# Patient Record
Sex: FEMALE | URBAN - METROPOLITAN AREA
[De-identification: names, ages, dates, MRNs, and addresses within clinical notes are randomized per-mention and may not be internally consistent; named-entity substitution may affect disease eponyms.]

---

## 2022-01-12 ENCOUNTER — APPOINTMENT (RX ONLY)
Dept: URBAN - METROPOLITAN AREA OTHER 8 | Facility: OTHER | Age: 27
Setting detail: DERMATOLOGY
End: 2022-01-12

## 2022-01-12 DIAGNOSIS — L81.4 OTHER MELANIN HYPERPIGMENTATION: ICD-10-CM

## 2022-01-12 DIAGNOSIS — L71.8 OTHER ROSACEA: ICD-10-CM

## 2022-01-12 DIAGNOSIS — D22 MELANOCYTIC NEVI: ICD-10-CM

## 2022-01-12 DIAGNOSIS — Z12.83 ENCOUNTER FOR SCREENING FOR MALIGNANT NEOPLASM OF SKIN: ICD-10-CM

## 2022-01-12 PROBLEM — D22.5 MELANOCYTIC NEVI OF TRUNK: Status: ACTIVE | Noted: 2022-01-12

## 2022-01-12 PROCEDURE — ? PRESCRIPTION

## 2022-01-12 PROCEDURE — 99203 OFFICE O/P NEW LOW 30 MIN: CPT

## 2022-01-12 PROCEDURE — ? COUNSELING

## 2022-01-12 PROCEDURE — ? OBSERVATION AND MEASURE

## 2022-01-12 RX ORDER — IVERMECTIN 10 MG/G
CREAM TOPICAL QD
Qty: 45 | Refills: 4 | Status: ERX | COMMUNITY
Start: 2022-01-12

## 2022-01-12 RX ADMIN — IVERMECTIN: 10 CREAM TOPICAL at 00:00

## 2022-01-12 ASSESSMENT — LOCATION DETAILED DESCRIPTION DERM
LOCATION DETAILED: RIGHT INFERIOR UPPER BACK
LOCATION DETAILED: RIGHT MEDIAL FOREHEAD
LOCATION DETAILED: RIGHT SUPERIOR LATERAL LOWER BACK
LOCATION DETAILED: RIGHT INFERIOR CENTRAL MALAR CHEEK
LOCATION DETAILED: EPIGASTRIC SKIN
LOCATION DETAILED: LEFT UPPER CUTANEOUS LIP
LOCATION DETAILED: RIGHT MEDIAL BREAST 2-3:00 REGION
LOCATION DETAILED: RIGHT UPPER CUTANEOUS LIP
LOCATION DETAILED: LEFT INFERIOR CENTRAL MALAR CHEEK
LOCATION DETAILED: RIGHT INFERIOR VERMILION LIP

## 2022-01-12 ASSESSMENT — LOCATION SIMPLE DESCRIPTION DERM
LOCATION SIMPLE: RIGHT FOREHEAD
LOCATION SIMPLE: RIGHT INFERIOR LIP
LOCATION SIMPLE: LEFT CHEEK
LOCATION SIMPLE: RIGHT CHEEK
LOCATION SIMPLE: RIGHT BREAST
LOCATION SIMPLE: RIGHT LOWER BACK
LOCATION SIMPLE: ABDOMEN
LOCATION SIMPLE: RIGHT UPPER BACK
LOCATION SIMPLE: LEFT LIP
LOCATION SIMPLE: RIGHT LIP

## 2022-01-12 ASSESSMENT — SEVERITY ASSESSMENT OVERALL AMONG ALL PATIENTS
IN YOUR EXPERIENCE, AMONG ALL PATIENTS YOU HAVE SEEN WITH THIS CONDITION, HOW SEVERE IS THIS PATIENT'S CONDITION?: MILD TO MODERATE

## 2022-01-12 ASSESSMENT — LOCATION ZONE DERM
LOCATION ZONE: FACE
LOCATION ZONE: TRUNK
LOCATION ZONE: LIP

## 2022-01-12 NOTE — PROCEDURE: OBSERVATION
Detail Level: Detailed
Size Of Lesion: 0.4cm
Morphology Per Location (Optional): Darker brown irregular hazy macule
Size Of Lesion: 0.6cm
Morphology Per Location (Optional): Asymmetrical medium brown macule with darker dots

## 2022-03-23 ENCOUNTER — APPOINTMENT (RX ONLY)
Dept: URBAN - METROPOLITAN AREA OTHER 8 | Facility: OTHER | Age: 27
Setting detail: DERMATOLOGY
End: 2022-03-23

## 2022-03-23 DIAGNOSIS — D22 MELANOCYTIC NEVI: ICD-10-CM

## 2022-03-23 DIAGNOSIS — Z41.9 ENCOUNTER FOR PROCEDURE FOR PURPOSES OTHER THAN REMEDYING HEALTH STATE, UNSPECIFIED: ICD-10-CM

## 2022-03-23 PROBLEM — D22.5 MELANOCYTIC NEVI OF TRUNK: Status: ACTIVE | Noted: 2022-03-23

## 2022-03-23 PROCEDURE — ? OBSERVATION AND MEASURE

## 2022-03-23 PROCEDURE — 99212 OFFICE O/P EST SF 10 MIN: CPT

## 2022-03-23 PROCEDURE — ? PRODUCT LINE (ANTI-AGING)

## 2022-03-23 PROCEDURE — ? COUNSELING

## 2022-03-23 ASSESSMENT — LOCATION SIMPLE DESCRIPTION DERM
LOCATION SIMPLE: RIGHT BREAST
LOCATION SIMPLE: RIGHT UPPER BACK
LOCATION SIMPLE: RIGHT LOWER BACK
LOCATION SIMPLE: ABDOMEN

## 2022-03-23 ASSESSMENT — LOCATION DETAILED DESCRIPTION DERM
LOCATION DETAILED: RIGHT MEDIAL BREAST 2-3:00 REGION
LOCATION DETAILED: RIGHT INFERIOR UPPER BACK
LOCATION DETAILED: RIGHT SUPERIOR LATERAL LOWER BACK
LOCATION DETAILED: EPIGASTRIC SKIN

## 2022-03-23 ASSESSMENT — LOCATION ZONE DERM: LOCATION ZONE: TRUNK

## 2022-03-23 NOTE — PROCEDURE: PRODUCT LINE (ANTI-AGING)
Product 71 Price (In Dollars - Numeric Only, No Special Characters Or $): 0.00
Product 26 Units: 0
Product 2 Units: 1
Product 6 Application Directions: Apply to eyelid skin twice a day.
Render Product Pricing In Note: No
Product 2 Application Directions: Apply across entire face at bedtime.
Name Of Product 13: NeoCutis Bio Serum
Product 10 Application Directions: Apply to affected areas of the neck once nightly
Product 8 Application Directions: Apply across pigmented areas of face twice a day.
Name Of Product 7: SkinBetter Intensive Treatment LINES
Name Of Product 18: AHA /BHA exfoliating cleanser
Name Of Product 16: NeoCutis Micro Firm Neck
Name Of Product 5: SkinBetter Interfuse Treatment (Face & Neck cream)
Name Of Product 3: SkinBetter Instant Effect Eye Gel
Product 13 Price (In Dollars - Numeric Only, No Special Characters Or $): 235
Name Of Product 11: Neocutis Bio Cream
Name Of Product 1: SkinBetter Alto Defense Serum
Name Of Product 9: SkinBetter TRIO
Name Of Product 14: NeoCutis Journee
Product 7 Price (In Dollars - Numeric Only, No Special Characters Or $): 130.00
Detail Level: Zone
Product 5 Price (In Dollars - Numeric Only, No Special Characters Or $): 105.00
Product 11 Price (In Dollars - Numeric Only, No Special Characters Or $): 160
Product 16 Price (In Dollars - Numeric Only, No Special Characters Or $): 135
Product 3 Price (In Dollars - Numeric Only, No Special Characters Or $): 90.00
Product 1 Price (In Dollars - Numeric Only, No Special Characters Or $): 155.00
Product 9 Price (In Dollars - Numeric Only, No Special Characters Or $): 135.00
Product 14 Price (In Dollars - Numeric Only, No Special Characters Or $): 140
Product 7 Application Directions: Apply to targeted areas prior to all other skincare products - twice a day for 3 months, then decrease to once a day. Will consider adding after applying Alt[place]o x 3 months
Product 11 Application Directions: Apply across face and neck once daily.
Assigning Risk Information: Per AMA, level of risk is based upon consequences of the problem(s) addressed at the encounter when appropriately treated. Risk also includes medical decision making related to the need to initiate or forego further testing, treatment and/or hospitalization. Over the counter medication are assigned a risk level of low. Prescription medication management is assigned a risk level of moderate.
Product 16 Application Directions: Apply nightly in upwards sweeping motions to neck and chest.
Risk Of Complication Category: No MDM
Product 3 Application Directions: Apply to undereye area every morning; may repeat at bedtime if allergies
Product 5 Application Directions: Apply across entire face twice a day.
Product 1 Application Directions: Continue Applying across entire face every morning under sunscreen.
Product 9 Application Directions: Apply across face twice a day.
Name Of Product 12: NeoCutis Bio Gel
Allow Plan To Count Towards E/M Coding: No (this will remove associated impression from E/M calculation)
Name Of Product 4: SkinBetter INTENSIVE [place]
Name Of Product 6: SkinBetter Daily Treatment (Eye Cream)
Name Of Product 8: SkinBetter EVEN Tone
Name Of Product 10: Neocutis microfirm neck
Name Of Product 2: Zi Ruvalcaba
Name Of Product 15: Jaylin Manning Gautam Eye Cream
Product 4 Price (In Dollars - Numeric Only, No Special Characters Or $): 125.00
Product 15 Price (In Dollars - Numeric Only, No Special Characters Or $): 108
Product 8 Price (In Dollars - Numeric Only, No Special Characters Or $): 140.00

## 2022-09-15 ENCOUNTER — RX ONLY (OUTPATIENT)
Age: 27
Setting detail: RX ONLY
End: 2022-09-15

## 2022-09-15 RX ORDER — TRETIONIN 0.25 MG/G
CREAM TOPICAL
Qty: 45 | Refills: 3 | Status: ERX | COMMUNITY
Start: 2022-09-15